# Patient Record
Sex: FEMALE | Race: BLACK OR AFRICAN AMERICAN | NOT HISPANIC OR LATINO
[De-identification: names, ages, dates, MRNs, and addresses within clinical notes are randomized per-mention and may not be internally consistent; named-entity substitution may affect disease eponyms.]

---

## 2021-11-10 PROBLEM — Z00.00 ENCOUNTER FOR PREVENTIVE HEALTH EXAMINATION: Status: ACTIVE | Noted: 2021-11-10

## 2021-12-08 ENCOUNTER — APPOINTMENT (OUTPATIENT)
Dept: ORTHOPEDIC SURGERY | Facility: CLINIC | Age: 56
End: 2021-12-08

## 2022-02-23 ENCOUNTER — APPOINTMENT (OUTPATIENT)
Dept: ORTHOPEDIC SURGERY | Facility: CLINIC | Age: 57
End: 2022-02-23

## 2022-04-01 ENCOUNTER — OUTPATIENT (OUTPATIENT)
Dept: OUTPATIENT SERVICES | Facility: HOSPITAL | Age: 57
LOS: 1 days | End: 2022-04-01
Payer: COMMERCIAL

## 2022-04-01 ENCOUNTER — RESULT REVIEW (OUTPATIENT)
Age: 57
End: 2022-04-01

## 2022-04-01 ENCOUNTER — APPOINTMENT (OUTPATIENT)
Dept: ORTHOPEDIC SURGERY | Facility: CLINIC | Age: 57
End: 2022-04-01
Payer: COMMERCIAL

## 2022-04-01 VITALS
WEIGHT: 214 LBS | BODY MASS INDEX: 34.39 KG/M2 | DIASTOLIC BLOOD PRESSURE: 71 MMHG | OXYGEN SATURATION: 96 % | HEART RATE: 88 BPM | HEIGHT: 66 IN | TEMPERATURE: 97 F | SYSTOLIC BLOOD PRESSURE: 104 MMHG

## 2022-04-01 DIAGNOSIS — Z86.718 PERSONAL HISTORY OF OTHER VENOUS THROMBOSIS AND EMBOLISM: ICD-10-CM

## 2022-04-01 PROCEDURE — 72020 X-RAY EXAM OF SPINE 1 VIEW: CPT

## 2022-04-01 PROCEDURE — 99204 OFFICE O/P NEW MOD 45 MIN: CPT

## 2022-04-01 PROCEDURE — 72020 X-RAY EXAM OF SPINE 1 VIEW: CPT | Mod: 26

## 2022-04-01 PROCEDURE — 73502 X-RAY EXAM HIP UNI 2-3 VIEWS: CPT

## 2022-04-01 PROCEDURE — 73502 X-RAY EXAM HIP UNI 2-3 VIEWS: CPT | Mod: 26,RT

## 2022-04-01 RX ORDER — LISINOPRIL 30 MG/1
TABLET ORAL
Refills: 0 | Status: ACTIVE | COMMUNITY

## 2022-04-02 RX ORDER — LISINOPRIL AND HYDROCHLOROTHIAZIDE TABLETS 20; 25 MG/1; MG/1
20-25 TABLET ORAL
Qty: 30 | Refills: 0 | Status: ACTIVE | COMMUNITY
Start: 2021-12-15

## 2022-04-02 RX ORDER — OXYCODONE HYDROCHLORIDE 30 MG/1
30 TABLET ORAL
Qty: 60 | Refills: 0 | Status: ACTIVE | COMMUNITY
Start: 2022-03-16

## 2022-04-02 NOTE — PHYSICAL EXAM
[de-identified] : General appearance: well nourished and hydrated, pleasant, alert and oriented x 3, cooperative.  Obese habitus.\par HEENT: normocephalic, EOM intact, wearing mask, external auditory canal clear.  \par Cardiovascular: no lower leg edema, no varicosities, dorsalis pedis pulses palpable and symmetric.  \par Lymphatics: no palpable lymphadenopathy, no lymphedema.  \par Neurologic: sensation is normal, no muscle weakness in upper or lower extremities, patella tendon reflexes present and symmetric.  \par Dermatologic: skin moist, warm, no rash.  \par Spine: cervical spine with normal lordosis and painless range of motion, thoracic spine with normal kyphosis and painless range of motion, lumbosacral spine with normal lordosis and restricted range of motion. \par Gait: right coxalgia.\par \par Left hip: all fields negative/normal/unremarkable unless otherwise noted --\par - Focal soft tissue swelling: \par - Ecchymosis: \par - Erythema: \par - Wounds: \par - Tenderness: \par - ROM: \par   - Flexion: 90\par   - Extension: 0\par   - Adduction: 10\par   - Abduction: 30\par   - Internal rotation in 90 degrees of hip flexion: 10\par   - External rotation in 90 degrees of hip flexion: 30\par - ABILIO: uncomfortable\par - FADIR: uncomfortable\par - Doug: \par - Stinchfield: negative\par - Flexor power: \par - Abductor power: \par \par Right hip: all fields negative/normal/unremarkable unless otherwise noted --\par - Focal soft tissue swelling: \par - Ecchymosis: \par - Erythema: \par - Wounds: \par - Tenderness: mild anterior and lateral\par - ROM: \par   - Flexion: 90\par   - Extension: 0\par   - Adduction: 5\par   - Abduction: 20\par   - Internal rotation in 90 degrees of hip flexion: 0\par   - External rotation in 90 degrees of hip flexion: 30\par - ABILIO: painful\par - FADIR: painful\par - Doug: positive\par - Stinchfield: positive\par - Flexor power: 4+/5\par - Abductor power: 4+/5 [de-identified] : A lateral view of the lumbosacral spine, weightbearing AP pelvis, and 2 additional views (frog lateral and false profile) of the right hip were interpreted by me and reviewed with the patient.\par \par Location of imaging: [facility]\par Date of exam: 4/1/22\par \par Lumbar spine --\par Alignment: normal\par Spondylosis: moderate multilevel\par Listhesis: none\par Posterior elements: moderate/severe multilevel facet arthrosis\par \par Pelvic alignment: \par \par Right hip --\par Arthritis: severe\par Deformity: none\par Osteonecrosis: secondary sclerotic and cystic change\par \par Left hip --\par Arthritis: moderate\par Deformity: none\par Osteonecrosis: none

## 2022-04-02 NOTE — HISTORY OF PRESENT ILLNESS
[de-identified] : 4/1/22: 55y/o female presenting for evaluation of right hip pain and limping. Symptoms have been present for a year, got bad about 2 months ago. No injury. Pain is localized over the groin and there is occasional achiness over the ipsilateral lateral leg. Pain is maximal when rising from seating/lying position. After the first few steps, the pain improves and she endorses unlimited ambulatory tolerance without an assistive device. However, she limps all the time. Still able to manage her shoes but it has become an increasingly complex chore. Treatments thus far have included Tylenol, Advil, Aleve, aspirin. She tries not to use medications regularly. 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 \par \par PMH sig for HTN and PE in 2011. She has some family history of VTE and further hematologic workup was recommended but she never followed through with it. She is not aware of any further VTE history for herself. [Worsening] : worsening [8] : a current pain level of 8/10 [Rest] : relieved by rest [de-identified] : throbbing [de-identified] : standing from seated position

## 2022-04-02 NOTE — DISCUSSION/SUMMARY
[de-identified] : 57y/o female with R > L hip osteoarthritis\par - We briefly discussed ALAN but will trial a course of conservative mgmt prior to considering surgical booking\par - PT\par - HEP\par - Tylenol + meloxicam as needed\par - RTC 3mo, no new XRs needed. Consider R ALAN booking if conservative measures fail to provide adequate relief No

## 2022-04-05 DIAGNOSIS — M51.37 OTHER INTERVERTEBRAL DISC DEGENERATION, LUMBOSACRAL REGION: ICD-10-CM

## 2022-04-05 DIAGNOSIS — M16.0 BILATERAL PRIMARY OSTEOARTHRITIS OF HIP: ICD-10-CM

## 2022-04-05 DIAGNOSIS — M47.817 SPONDYLOSIS WITHOUT MYELOPATHY OR RADICULOPATHY, LUMBOSACRAL REGION: ICD-10-CM

## 2022-04-05 DIAGNOSIS — M25.78 OSTEOPHYTE, VERTEBRAE: ICD-10-CM

## 2022-04-05 DIAGNOSIS — M25.551 PAIN IN RIGHT HIP: ICD-10-CM

## 2022-04-05 DIAGNOSIS — M54.50 LOW BACK PAIN, UNSPECIFIED: ICD-10-CM

## 2022-07-22 ENCOUNTER — APPOINTMENT (OUTPATIENT)
Dept: ORTHOPEDIC SURGERY | Facility: CLINIC | Age: 57
End: 2022-07-22

## 2022-09-30 ENCOUNTER — APPOINTMENT (OUTPATIENT)
Dept: ORTHOPEDIC SURGERY | Facility: CLINIC | Age: 57
End: 2022-09-30

## 2023-01-04 ENCOUNTER — APPOINTMENT (OUTPATIENT)
Dept: ORTHOPEDIC SURGERY | Facility: CLINIC | Age: 58
End: 2023-01-04

## 2023-01-20 ENCOUNTER — OUTPATIENT (OUTPATIENT)
Dept: OUTPATIENT SERVICES | Facility: HOSPITAL | Age: 58
LOS: 1 days | End: 2023-01-20
Payer: COMMERCIAL

## 2023-01-20 ENCOUNTER — RESULT REVIEW (OUTPATIENT)
Age: 58
End: 2023-01-20

## 2023-01-20 ENCOUNTER — APPOINTMENT (OUTPATIENT)
Dept: ORTHOPEDIC SURGERY | Facility: CLINIC | Age: 58
End: 2023-01-20
Payer: COMMERCIAL

## 2023-01-20 VITALS
HEIGHT: 66 IN | WEIGHT: 214 LBS | BODY MASS INDEX: 34.39 KG/M2 | DIASTOLIC BLOOD PRESSURE: 85 MMHG | SYSTOLIC BLOOD PRESSURE: 139 MMHG | OXYGEN SATURATION: 93 % | HEART RATE: 88 BPM

## 2023-01-20 PROCEDURE — 99214 OFFICE O/P EST MOD 30 MIN: CPT

## 2023-01-20 PROCEDURE — 73502 X-RAY EXAM HIP UNI 2-3 VIEWS: CPT | Mod: 26,RT

## 2023-01-20 PROCEDURE — 73502 X-RAY EXAM HIP UNI 2-3 VIEWS: CPT

## 2023-01-24 NOTE — ADDENDUM
[FreeTextEntry1] : Documented by Jewels Payne acting as a scribe for Dr. Aureliano Mckenzie on 01/20/2023.

## 2023-01-24 NOTE — HISTORY OF PRESENT ILLNESS
[de-identified] : 01/20/2023: 56 y/o female presenting for f/u of right hip OA. She was last seen in April 2022 and was started on conservative management. Overall, her symptoms have worsened since that visit and today she would like to discuss right ALAN. \par \par 4/1/22: 55y/o female presenting for evaluation of right hip pain and limping. Symptoms have been present for a year, got bad about 2 months ago. No injury. Pain is localized over the groin and there is occasional achiness over the ipsilateral lateral leg. Pain is maximal when rising from seating/lying position. After the first few steps, the pain improves and she endorses unlimited ambulatory tolerance without an assistive device. However, she limps all the time. Still able to manage her shoes but it has become an increasingly complex chore. Treatments thus far have included Tylenol, Advil, Aleve, aspirin. She tries not to use medications regularly. 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 \par \par PMH sig for HTN and PE in 2011. She has some family history of VTE and further hematologic workup was recommended but she never followed through with it. She is not aware of any further VTE history for herself.

## 2023-01-24 NOTE — PHYSICAL EXAM
[de-identified] : General appearance: well nourished and hydrated, pleasant, alert and oriented x 3, cooperative.  Obese habitus.\par HEENT: normocephalic, EOM intact, wearing mask, external auditory canal clear.  \par Cardiovascular: no lower leg edema, no varicosities, dorsalis pedis pulses palpable and symmetric.  \par Lymphatics: no palpable lymphadenopathy, no lymphedema.  \par Neurologic: sensation is normal, no muscle weakness in upper or lower extremities, patella tendon reflexes present and symmetric.  \par Dermatologic: skin moist, warm, no rash.  \par Spine: cervical spine with normal lordosis and painless range of motion, thoracic spine with normal kyphosis and painless range of motion, lumbosacral spine with normal lordosis and restricted range of motion. \par Gait: right coxalgia.\par \par Limb lengths: clinically equal\par \par Left hip: all fields negative/normal/unremarkable unless otherwise noted --\par - Focal soft tissue swelling: \par - Ecchymosis: \par - Erythema: \par - Wounds: \par - Tenderness: \par - ROM: \par   - Flexion: 90\par   - Extension: 0\par   - Adduction: to midline\par   - Abduction: 15\par   - Internal rotation in 90 degrees of hip flexion: 0\par   - External rotation in 90 degrees of hip flexion: 20\par - ABILIO: positive\par - FADIR: positive\par - Doug: positive\par - Stinchfield: positive\par - Flexor power: 4+/5\par - Abductor power: 4+/5, limited by pain\par \par Right hip: all fields negative/normal/unremarkable unless otherwise noted --\par - Focal soft tissue swelling: \par - Ecchymosis: \par - Erythema: \par - Wounds: \par - Tenderness: mild anterior and lateral\par - ROM: \par   - Flexion: 80\par   - Extension: 0\par   - Adduction: to midline\par   - Abduction: 10\par   - Internal rotation in 90 degrees of hip flexion: 5 degrees obligate external rotation\par   - External rotation in 90 degrees of hip flexion: 10\par - ABILIO: painful\par - FADIR: painful\par - Doug: positive\par - Stinchfield: positive\par - Flexor power: 2/5\par - Abductor power: 4+/5, limited by pain [de-identified] : B/l hip radiographs were obtained. These demonstrate normal pelvic alignment. Right hip demonstrates severe OA with bone on bone superior and superomedial articulation, Tonnis 3. Encircling osteophytes are noted, though the hip does not demonstrate Protrusio deformity. Left hip demonstrates moderate to severe OA with nearly bone on bone medial articulation, Tonnis 2-3. Again, encircling acetabular osteophytes are noted, although there is not coxa profunda or Protrusio deformity. There is no radiographic evidence of primary osteonecrosis of either hip.

## 2023-01-24 NOTE — END OF VISIT
[FreeTextEntry3] : All medical record entries made by the Scribe were at my, Dr. Aureliano Mckenzie, direction and personally dictated by me on 01/20/2023. I have reviewed the chart and agree that the record accurately reflects my personal performance of the history, physical exam, assessment and plan. I have also personally directed, reviewed, and agreed with the chart.

## 2023-02-08 ENCOUNTER — OUTPATIENT (OUTPATIENT)
Dept: OUTPATIENT SERVICES | Facility: HOSPITAL | Age: 58
LOS: 1 days | End: 2023-02-08
Payer: COMMERCIAL

## 2023-02-08 ENCOUNTER — APPOINTMENT (OUTPATIENT)
Dept: INTERVENTIONAL RADIOLOGY/VASCULAR | Facility: HOSPITAL | Age: 58
End: 2023-02-08

## 2023-02-08 ENCOUNTER — RESULT REVIEW (OUTPATIENT)
Age: 58
End: 2023-02-08

## 2023-02-08 PROCEDURE — 77002 NEEDLE LOCALIZATION BY XRAY: CPT | Mod: 26

## 2023-02-08 PROCEDURE — 77002 NEEDLE LOCALIZATION BY XRAY: CPT

## 2023-02-08 PROCEDURE — 20610 DRAIN/INJ JOINT/BURSA W/O US: CPT

## 2023-02-08 PROCEDURE — 20610 DRAIN/INJ JOINT/BURSA W/O US: CPT | Mod: RT

## 2023-04-12 ENCOUNTER — APPOINTMENT (OUTPATIENT)
Dept: ORTHOPEDIC SURGERY | Facility: CLINIC | Age: 58
End: 2023-04-12

## 2023-05-03 ENCOUNTER — APPOINTMENT (OUTPATIENT)
Dept: ORTHOPEDIC SURGERY | Facility: CLINIC | Age: 58
End: 2023-05-03
Payer: COMMERCIAL

## 2023-05-03 VITALS
HEART RATE: 96 BPM | DIASTOLIC BLOOD PRESSURE: 72 MMHG | SYSTOLIC BLOOD PRESSURE: 123 MMHG | WEIGHT: 206 LBS | HEIGHT: 66 IN | BODY MASS INDEX: 33.11 KG/M2 | OXYGEN SATURATION: 98 %

## 2023-05-03 DIAGNOSIS — M16.0 BILATERAL PRIMARY OSTEOARTHRITIS OF HIP: ICD-10-CM

## 2023-05-03 PROCEDURE — 99213 OFFICE O/P EST LOW 20 MIN: CPT

## 2023-05-03 RX ORDER — MELOXICAM 15 MG/1
15 TABLET ORAL DAILY
Qty: 30 | Refills: 2 | Status: ACTIVE | COMMUNITY
Start: 2022-04-01 | End: 1900-01-01

## 2023-05-04 PROBLEM — M16.0 PRIMARY LOCALIZED OSTEOARTHRITIS OF HIPS, BILATERAL: Status: ACTIVE | Noted: 2022-04-01

## 2023-05-04 NOTE — ADDENDUM
[FreeTextEntry1] : Documented by Jewels Payne acting as a scribe for Dr. Aureliano Mckenzie on 05/03/2023.

## 2023-05-04 NOTE — PHYSICAL EXAM
[de-identified] : General appearance: well nourished and hydrated, pleasant, alert and oriented x 3, cooperative.  Obese habitus.\par HEENT: normocephalic, EOM intact, wearing mask, external auditory canal clear.  \par Cardiovascular: no lower leg edema, no varicosities, dorsalis pedis pulses palpable and symmetric.  \par Lymphatics: no palpable lymphadenopathy, no lymphedema.  \par Neurologic: sensation is normal, no muscle weakness in upper or lower extremities, patella tendon reflexes present and symmetric.  \par Dermatologic: skin moist, warm, no rash.  \par Spine: cervical spine with normal lordosis and painless range of motion, thoracic spine with normal kyphosis and painless range of motion, lumbosacral spine with normal lordosis and restricted range of motion. \par Gait: right coxalgia.\par \par Left hip: all fields negative/normal/unremarkable unless otherwise noted --\par - Focal soft tissue swelling: \par - Ecchymosis: \par - Erythema: \par - Wounds: \par - Tenderness: \par - ROM: \par   - Flexion: 90\par   - Extension: 0\par   - Adduction: 10\par   - Abduction: 30\par   - Internal rotation in 90 degrees of hip flexion: 10\par   - External rotation in 90 degrees of hip flexion: 30\par - ABILIO: uncomfortable\par - FADIR: uncomfortable\par - Doug: \par - Stinchfield: negative\par - Flexor power: \par - Abductor power: \par \par Right hip: all fields negative/normal/unremarkable unless otherwise noted --\par - Focal soft tissue swelling: \par - Ecchymosis: \par - Erythema: \par - Wounds: \par - Tenderness: mild anterior and lateral\par - ROM: \par   - Flexion: 90\par   - Extension: 0\par   - Adduction: 5\par   - Abduction: 20\par   - Internal rotation in 90 degrees of hip flexion: 0\par   - External rotation in 90 degrees of hip flexion: 30\par - ABILIO: painful\par - FADIR: painful\par - Doug: positive\par - Stinchfield: positive\par - Flexor power: 4+/5\par - Abductor power: 4+/5

## 2023-05-04 NOTE — END OF VISIT
[FreeTextEntry3] : All medical record entries made by the Scribe were at my, Dr. Aureliano Mckenzie, direction and personally dictated by me on 05/03/2023. I have reviewed the chart and agree that the record accurately reflects my personal performance of the history, physical exam, assessment and plan. I have also personally directed, reviewed, and agreed with the chart.

## 2023-05-04 NOTE — DISCUSSION/SUMMARY
[de-identified] : 57 y/o female f/u for right hip OA. \par - She was provided a PT referral and meloxicam Rx. \par - F/u when she is ready to discuss right ALAN (most likely Oct/Nov timeframe).

## 2024-02-02 ENCOUNTER — APPOINTMENT (OUTPATIENT)
Dept: ORTHOPEDIC SURGERY | Facility: CLINIC | Age: 59
End: 2024-02-02